# Patient Record
Sex: MALE | Race: BLACK OR AFRICAN AMERICAN | Employment: FULL TIME | URBAN - METROPOLITAN AREA
[De-identification: names, ages, dates, MRNs, and addresses within clinical notes are randomized per-mention and may not be internally consistent; named-entity substitution may affect disease eponyms.]

---

## 2018-08-02 ENCOUNTER — HOSPITAL ENCOUNTER (OUTPATIENT)
Dept: GENERAL RADIOLOGY | Age: 33
Discharge: HOME | End: 2018-08-02
Payer: COMMERCIAL

## 2018-08-02 DIAGNOSIS — M25.569 KNEE PAIN: ICD-10-CM

## 2018-08-02 PROCEDURE — 73562 X-RAY EXAM OF KNEE 3: CPT

## 2020-07-21 ENCOUNTER — APPOINTMENT (OUTPATIENT)
Dept: GENERAL RADIOLOGY | Age: 35
End: 2020-07-21
Attending: EMERGENCY MEDICINE
Payer: SELF-PAY

## 2020-07-21 ENCOUNTER — HOSPITAL ENCOUNTER (EMERGENCY)
Age: 35
Discharge: HOME | End: 2020-07-21
Attending: EMERGENCY MEDICINE | Admitting: EMERGENCY MEDICINE
Payer: SELF-PAY

## 2020-07-21 VITALS
SYSTOLIC BLOOD PRESSURE: 94 MMHG | TEMPERATURE: 98 F | DIASTOLIC BLOOD PRESSURE: 62 MMHG | WEIGHT: 200 LBS | OXYGEN SATURATION: 100 % | HEART RATE: 84 BPM | RESPIRATION RATE: 18 BRPM | BODY MASS INDEX: 29.53 KG/M2

## 2020-07-21 DIAGNOSIS — S82.839A AVULSION FRACTURE OF DISTAL END OF FIBULA: Primary | ICD-10-CM

## 2020-07-21 PROCEDURE — 74011250637 HC RX REV CODE- 250/637: Performed by: PHYSICIAN ASSISTANT

## 2020-07-21 PROCEDURE — 99283 EMERGENCY DEPT VISIT LOW MDM: CPT

## 2020-07-21 PROCEDURE — 73610 X-RAY EXAM OF ANKLE: CPT

## 2020-07-21 RX ORDER — HUMIDIFIER
1 EACH MISCELLANEOUS
Qty: 2 EACH | Refills: 0 | Status: SHIPPED | OUTPATIENT
Start: 2020-07-21

## 2020-07-21 RX ORDER — HYDROCODONE BITARTRATE AND ACETAMINOPHEN 5; 325 MG/1; MG/1
1 TABLET ORAL
Qty: 10 TAB | Refills: 0 | Status: SHIPPED | OUTPATIENT
Start: 2020-07-21 | End: 2020-07-24

## 2020-07-21 RX ORDER — IBUPROFEN 400 MG/1
800 TABLET ORAL
Status: COMPLETED | OUTPATIENT
Start: 2020-07-21 | End: 2020-07-21

## 2020-07-21 RX ADMIN — IBUPROFEN 800 MG: 400 TABLET, FILM COATED ORAL at 19:04

## 2020-07-21 NOTE — ED PROVIDER NOTES
Krista Severino is a 30 y.o. male  who presents by private vehicle to ER with c/o Patient presents with: Ankle Pain  Patient presents with right ankle pain after injuring it while playing basketball. Patient reports that he jumped up to block a shot and when he landed he twisted his ankle. Patient denies falling. dneies head injury or LOC. Patient with significant swelling to lateral ankle. He specifically denies any fevers, chills, nausea, vomiting, chest pain, shortness of breath, headache, rash, diarrhea, abdominal pain, urinary/bowel changes, sweating or weight loss. PCP: Monica Schmidt MD   PMHx significant for: No past medical history on file. PSHx significant for: No past surgical history on file. Social Hx: Tobacco use: Social History    Tobacco Use      Smoking status: Never Smoker  ; EtOH use: The patient states he drinks 0 per week.; Illicit Drug use: Allergies:  No Known Allergies    There are no other complaints, changes or physical findings at this time. No past medical history on file. No past surgical history on file. No family history on file.     Social History     Socioeconomic History    Marital status: SINGLE     Spouse name: Not on file    Number of children: Not on file    Years of education: Not on file    Highest education level: Not on file   Occupational History    Not on file   Social Needs    Financial resource strain: Not on file    Food insecurity     Worry: Not on file     Inability: Not on file    Transportation needs     Medical: Not on file     Non-medical: Not on file   Tobacco Use    Smoking status: Never Smoker   Substance and Sexual Activity    Alcohol use: No    Drug use: Not on file    Sexual activity: Not on file   Lifestyle    Physical activity     Days per week: Not on file     Minutes per session: Not on file    Stress: Not on file   Relationships    Social connections     Talks on phone: Not on file     Gets together: Not on file     Attends Hindu service: Not on file     Active member of club or organization: Not on file     Attends meetings of clubs or organizations: Not on file     Relationship status: Not on file    Intimate partner violence     Fear of current or ex partner: Not on file     Emotionally abused: Not on file     Physically abused: Not on file     Forced sexual activity: Not on file   Other Topics Concern    Not on file   Social History Narrative    Not on file         ALLERGIES: Patient has no known allergies. Review of Systems   Constitutional: Negative for activity change, appetite change, chills and fever. HENT: Negative for congestion and sore throat. Respiratory: Negative for cough and shortness of breath. Cardiovascular: Negative for chest pain. Gastrointestinal: Negative for abdominal pain, diarrhea, nausea and vomiting. Genitourinary: Negative for dysuria. Musculoskeletal: Positive for arthralgias and joint swelling. Negative for myalgias. Skin: Negative for color change. Neurological: Negative for dizziness. Psychiatric/Behavioral: The patient is not nervous/anxious. All other systems reviewed and are negative. Vitals:    07/21/20 1810   BP: 94/62   Pulse: 84   Resp: 18   Temp: 98 °F (36.7 °C)   SpO2: 100%   Weight: 90.7 kg (200 lb)            Physical Exam  Vitals signs and nursing note reviewed. Constitutional:       Appearance: He is well-developed. HENT:      Head: Normocephalic and atraumatic. Right Ear: External ear normal.      Left Ear: External ear normal.      Mouth/Throat:      Pharynx: No oropharyngeal exudate. Eyes:      General: No scleral icterus. Right eye: No discharge. Left eye: No discharge. Conjunctiva/sclera: Conjunctivae normal.      Pupils: Pupils are equal, round, and reactive to light. Neck:      Musculoskeletal: Normal range of motion and neck supple. Thyroid: No thyromegaly.       Trachea: No tracheal deviation. Cardiovascular:      Rate and Rhythm: Normal rate and regular rhythm. Heart sounds: Normal heart sounds. No murmur. Pulmonary:      Effort: Pulmonary effort is normal. No respiratory distress. Breath sounds: Normal breath sounds. No wheezing or rales. Abdominal:      General: Bowel sounds are normal. There is no distension. Palpations: Abdomen is soft. Tenderness: There is no abdominal tenderness. There is no guarding or rebound. Musculoskeletal: Normal range of motion. Right ankle: He exhibits swelling. Tenderness. Lateral malleolus tenderness found. Comments: Right lower extremity is neurovascularly intact. Compartments are soft. Patient is able to move toes without difficulty. Lymphadenopathy:      Cervical: No cervical adenopathy. Skin:     General: Skin is warm. Findings: No erythema or rash. Neurological:      Mental Status: He is alert and oriented to person, place, and time. Cranial Nerves: No cranial nerve deficit. Coordination: Coordination normal.   Psychiatric:         Behavior: Behavior normal.         Thought Content: Thought content normal.         Judgment: Judgment normal.          MDM  Number of Diagnoses or Management Options  Avulsion fracture of distal end of fibula:   Diagnosis management comments: Assesment/Plan- 30 y.o. Patient presents with: Ankle Pain  differential includes: fracture, sprain. Labs and imaging reviewed with avulsion of distal fibula. Patient placed in walking boot. Recommend ortho follow up. Patient educated on reasons to return to the ED.            Procedures

## 2023-05-10 RX ORDER — CRUTCH
1 EACH MISCELLANEOUS
COMMUNITY
Start: 2020-07-21

## 2023-05-10 RX ORDER — OXYCODONE HYDROCHLORIDE AND ACETAMINOPHEN 5; 325 MG/1; MG/1
1 TABLET ORAL EVERY 4 HOURS PRN
COMMUNITY
Start: 2016-10-10